# Patient Record
Sex: FEMALE | Race: WHITE
[De-identification: names, ages, dates, MRNs, and addresses within clinical notes are randomized per-mention and may not be internally consistent; named-entity substitution may affect disease eponyms.]

---

## 2019-09-27 LAB
ALBUMIN SERPL BCP-MCNC: 3.6 G/DL (ref 3.4–5)
ALBUMIN/GLOB SERPL: 1 {RATIO} (ref 1.1–1.5)
ALP SERPL-CCNC: 83 IU/L (ref 46–116)
ALT SERPL W P-5'-P-CCNC: 37 U/L (ref 30–65)
ANION GAP SERPL CALCULATED.3IONS-SCNC: 12 MMOL/L (ref 8–16)
APTT PPP: 26 SECONDS (ref 22–32)
AST SERPL W P-5'-P-CCNC: 24 U/L (ref 10–37)
BASOPHILS # BLD AUTO: 0.1 X10'3 (ref 0–0.2)
BASOPHILS NFR BLD AUTO: 0.7 % (ref 0–1)
BILIRUB SERPL-MCNC: 0.3 MG/DL (ref 0–1)
BUN SERPL-MCNC: 6 MG/DL (ref 7–18)
BUN/CREAT SERPL: 7.7 (ref 6.6–38)
CALCIUM SERPL-MCNC: 8.9 MG/DL (ref 8.5–10.1)
CHLORIDE SERPL-SCNC: 106 MMOL/L (ref 99–107)
CO2 SERPL-SCNC: 23.7 MMOL/L (ref 24–32)
CREAT SERPL-MCNC: 0.78 MG/DL (ref 0.4–0.9)
EOSINOPHIL # BLD AUTO: 0.1 X10'3 (ref 0–0.9)
EOSINOPHIL NFR BLD AUTO: 1.7 % (ref 0–6)
ERYTHROCYTE [DISTWIDTH] IN BLOOD BY AUTOMATED COUNT: 12.9 % (ref 11.5–14.5)
GFR SERPL CREATININE-BSD FRML MDRD: 77 ML/MIN
GLUCOSE SERPL-MCNC: 231 MG/DL (ref 70–104)
HBA1C MFR BLD: 8.2 % (ref 4.5–6.2)
HCT VFR BLD AUTO: 40.1 % (ref 35–45)
HGB BLD-MCNC: 13.7 G/DL (ref 12–16)
INR PPP: 1 INR
LYMPHOCYTES # BLD AUTO: 4.3 X10'3 (ref 1.1–4.8)
LYMPHOCYTES NFR BLD AUTO: 49.5 % (ref 21–51)
MCH RBC QN AUTO: 32 PG (ref 27–31)
MCHC RBC AUTO-ENTMCNC: 34.3 G/DL (ref 33–36.5)
MCV RBC AUTO: 93.3 FL (ref 78–98)
MONOCYTES # BLD AUTO: 0.7 X10'3 (ref 0–0.9)
MONOCYTES NFR BLD AUTO: 8.2 % (ref 2–12)
NEUTROPHILS # BLD AUTO: 3.5 X10'3 (ref 1.8–7.7)
NEUTROPHILS NFR BLD AUTO: 39.9 % (ref 42–75)
PLATELET # BLD AUTO: 175 X10'3 (ref 140–440)
PMV BLD AUTO: 10.4 FL (ref 7.4–10.4)
POTASSIUM SERPL-SCNC: 3.2 MMOL/L (ref 3.4–5.1)
PROT SERPL-MCNC: 7.3 G/DL (ref 6.4–8.2)
PROTHROMBIN TIME: 10.2 SECONDS (ref 9–12)
RBC # BLD AUTO: 4.29 X10'6 (ref 4.2–5.6)
SODIUM SERPL-SCNC: 142 MMOL/L (ref 135–145)

## 2019-10-04 ENCOUNTER — HOSPITAL ENCOUNTER (OUTPATIENT)
Dept: HOSPITAL 94 - PAS | Age: 54
Discharge: HOME | End: 2019-10-04
Attending: ORTHOPAEDIC SURGERY
Payer: MEDICAID

## 2019-10-04 VITALS — SYSTOLIC BLOOD PRESSURE: 131 MMHG | DIASTOLIC BLOOD PRESSURE: 81 MMHG

## 2019-10-04 VITALS — BODY MASS INDEX: 32.93 KG/M2 | HEIGHT: 64 IN | WEIGHT: 192.9 LBS

## 2019-10-04 VITALS — SYSTOLIC BLOOD PRESSURE: 128 MMHG | DIASTOLIC BLOOD PRESSURE: 78 MMHG

## 2019-10-04 VITALS — SYSTOLIC BLOOD PRESSURE: 132 MMHG | DIASTOLIC BLOOD PRESSURE: 80 MMHG

## 2019-10-04 VITALS — DIASTOLIC BLOOD PRESSURE: 93 MMHG | SYSTOLIC BLOOD PRESSURE: 147 MMHG

## 2019-10-04 VITALS — SYSTOLIC BLOOD PRESSURE: 142 MMHG | DIASTOLIC BLOOD PRESSURE: 88 MMHG

## 2019-10-04 DIAGNOSIS — Z79.82: ICD-10-CM

## 2019-10-04 DIAGNOSIS — M18.0: Primary | ICD-10-CM

## 2019-10-04 DIAGNOSIS — F17.200: ICD-10-CM

## 2019-10-04 DIAGNOSIS — G47.30: ICD-10-CM

## 2019-10-04 DIAGNOSIS — Z96.653: ICD-10-CM

## 2019-10-04 DIAGNOSIS — Z79.01: ICD-10-CM

## 2019-10-04 DIAGNOSIS — E03.9: ICD-10-CM

## 2019-10-04 DIAGNOSIS — E11.9: ICD-10-CM

## 2019-10-04 DIAGNOSIS — E66.9: ICD-10-CM

## 2019-10-04 DIAGNOSIS — E78.00: ICD-10-CM

## 2019-10-04 DIAGNOSIS — Z79.84: ICD-10-CM

## 2019-10-04 DIAGNOSIS — K21.9: ICD-10-CM

## 2019-10-04 DIAGNOSIS — Z88.2: ICD-10-CM

## 2019-10-04 DIAGNOSIS — Z98.890: ICD-10-CM

## 2019-10-04 DIAGNOSIS — Z79.899: ICD-10-CM

## 2019-10-04 PROCEDURE — 80053 COMPREHEN METABOLIC PANEL: CPT

## 2019-10-04 PROCEDURE — 36415 COLL VENOUS BLD VENIPUNCTURE: CPT

## 2019-10-04 PROCEDURE — 85610 PROTHROMBIN TIME: CPT

## 2019-10-04 PROCEDURE — 83036 HEMOGLOBIN GLYCOSYLATED A1C: CPT

## 2019-10-04 PROCEDURE — 93005 ELECTROCARDIOGRAM TRACING: CPT

## 2019-10-04 PROCEDURE — 85730 THROMBOPLASTIN TIME PARTIAL: CPT

## 2019-10-04 PROCEDURE — 25447 ARTHRP NTRCRP/CRP/MTCR NTRPS: CPT

## 2019-10-04 PROCEDURE — 85025 COMPLETE CBC W/AUTO DIFF WBC: CPT

## 2019-10-04 PROCEDURE — 80047 BASIC METABLC PNL IONIZED CA: CPT
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
## 2019-10-04 NOTE — NUR
PT WANTS TO TALK TO DR FISHMAN ABOUT POST PAIN MEDS.  STATES SHE ALREADY HAS A SCRIPT SHE 
TURNED IN TO [facility] [place].  THEY TOLD HER THEY WOULD HAVE TO CHECK WITH MD'S OFFICE, 
BECAUSE SHE ALREADY HAS METHADONE AND OXYCODONE.  I TOLD HER THAT AT THIS POINT ITS UP TO 
HER AND HER PHARMACY TO FIGURE OUT.  SHE THEN STATED THAT IF SHE DOESNT GET ANY [place]CO SHE 
KNOWS WHERE TO GET THEM.

## 2019-10-04 NOTE — NUR
Received from OR via [facility] , accompanied by Anesthesiologist IGLESIA and report given by 
Anesthesiolgist. PATIENT WITH 20G PIV IN LEFT UE RUNNING LR . DENIES PAIN, PATIENT 
WITH SPLINT TO RIGHT UE THAT IS CDI. + CAP REFILL AND MOVEMENT. 


-------------------------------------------------------------------------------

Addendum: 10/04/19 at 3705 by Frank Patrick RN, RN

-------------------------------------------------------------------------------

Amended: Links added.

## 2019-10-07 LAB
ANION GAP BLD CALC-SCNC: 16 MMOL/L (ref 8–12)
BUN BLD-MCNC: < 3 MG/DL (ref 6–19)
BUN/CREAT BLD: 6 (ref 6.6–38)
CA-I BLD-SCNC: 1.23 MMOL/L (ref 1.03–1.32)
CHLORIDE BLD-SCNC: 107 MMOL/L (ref 99–107)
CO2 BLD-SCNC: 21 MMOL/L (ref 24–32)
CREAT BLD-MCNC: 0.5 MG/DL (ref 0.6–1.1)
GFR SERPL CREATININE-BSD FRML MDRD: > 90 ML/MIN
GLUCOSE SERPLBLD-MCNC: 146 MG/DL (ref 70–104)
HCT VFR BLD CALC: 45 %PCV (ref 35–48)
HGB BLD CALC-MCNC: 15.3 G/DL (ref 12–16)
POTASSIUM BLD-SCNC: 4.1 MMOL/L (ref 3.5–5.1)
SODIUM BLD-SCNC: 144 MMOL/L (ref 135–145)